# Patient Record
Sex: FEMALE | Race: ASIAN | NOT HISPANIC OR LATINO
[De-identification: names, ages, dates, MRNs, and addresses within clinical notes are randomized per-mention and may not be internally consistent; named-entity substitution may affect disease eponyms.]

---

## 2017-03-29 ENCOUNTER — CHARTING TRANS (OUTPATIENT)
Dept: OTHER | Age: 27
End: 2017-03-29

## 2017-03-29 ASSESSMENT — PAIN SCALES - GENERAL: PAINLEVEL_OUTOF10: 0

## 2017-04-12 ENCOUNTER — CHARTING TRANS (OUTPATIENT)
Dept: OTHER | Age: 27
End: 2017-04-12

## 2017-04-12 ENCOUNTER — LAB SERVICES (OUTPATIENT)
Dept: OTHER | Age: 27
End: 2017-04-12

## 2017-04-12 ASSESSMENT — PAIN SCALES - GENERAL: PAINLEVEL_OUTOF10: 0

## 2017-04-21 LAB — HPV I/H RISK 4 DNA CVX QL NAA+PROBE: NORMAL

## 2017-04-24 ENCOUNTER — CHARTING TRANS (OUTPATIENT)
Dept: OTHER | Age: 27
End: 2017-04-24

## 2017-04-28 ENCOUNTER — CHARTING TRANS (OUTPATIENT)
Dept: OTHER | Age: 27
End: 2017-04-28

## 2017-05-22 ENCOUNTER — LAB SERVICES (OUTPATIENT)
Dept: OTHER | Age: 27
End: 2017-05-22

## 2017-05-22 ENCOUNTER — CHARTING TRANS (OUTPATIENT)
Dept: OTHER | Age: 27
End: 2017-05-22

## 2017-05-22 ASSESSMENT — PAIN SCALES - GENERAL: PAINLEVEL_OUTOF10: 0

## 2017-05-25 LAB — SURGICAL PATHOLOGY: NORMAL

## 2018-01-16 ENCOUNTER — CHARTING TRANS (OUTPATIENT)
Dept: OTHER | Age: 28
End: 2018-01-16

## 2018-01-16 ASSESSMENT — PAIN SCALES - GENERAL: PAINLEVEL_OUTOF10: 0

## 2018-05-04 ENCOUNTER — CHARTING TRANS (OUTPATIENT)
Dept: OTHER | Age: 28
End: 2018-05-04

## 2018-07-11 ENCOUNTER — HOSPITAL (OUTPATIENT)
Dept: OTHER | Age: 28
End: 2018-07-11

## 2018-07-26 ENCOUNTER — CHARTING TRANS (OUTPATIENT)
Dept: OTHER | Age: 28
End: 2018-07-26

## 2018-07-30 ENCOUNTER — LAB SERVICES (OUTPATIENT)
Dept: OTHER | Age: 28
End: 2018-07-30

## 2018-07-30 ENCOUNTER — CHARTING TRANS (OUTPATIENT)
Dept: OTHER | Age: 28
End: 2018-07-30

## 2018-07-30 ASSESSMENT — PAIN SCALES - GENERAL: PAINLEVEL_OUTOF10: 4

## 2018-08-02 ENCOUNTER — CHARTING TRANS (OUTPATIENT)
Dept: OTHER | Age: 28
End: 2018-08-02

## 2018-08-02 LAB
C TRACH RRNA SPEC QL NAA+PROBE: NEGATIVE
N GONORRHOEA RRNA SPEC QL NAA+PROBE: NEGATIVE
SPECIMEN SOURCE: NORMAL

## 2018-08-04 ENCOUNTER — CHARTING TRANS (OUTPATIENT)
Dept: OTHER | Age: 28
End: 2018-08-04

## 2018-08-07 LAB — PAP WITH HIGH RISK HPV: NORMAL

## 2018-09-07 ENCOUNTER — CHARTING TRANS (OUTPATIENT)
Dept: OTHER | Age: 28
End: 2018-09-07

## 2018-10-31 VITALS
TEMPERATURE: 98.5 F | HEIGHT: 62 IN | WEIGHT: 120.26 LBS | DIASTOLIC BLOOD PRESSURE: 52 MMHG | BODY MASS INDEX: 22.13 KG/M2 | RESPIRATION RATE: 16 BRPM | SYSTOLIC BLOOD PRESSURE: 90 MMHG | OXYGEN SATURATION: 98 % | HEART RATE: 78 BPM

## 2018-10-31 VITALS
HEIGHT: 62 IN | BODY MASS INDEX: 21.79 KG/M2 | SYSTOLIC BLOOD PRESSURE: 96 MMHG | DIASTOLIC BLOOD PRESSURE: 60 MMHG | WEIGHT: 118.39 LBS | HEART RATE: 78 BPM

## 2018-11-01 VITALS
HEART RATE: 73 BPM | WEIGHT: 131 LBS | RESPIRATION RATE: 16 BRPM | DIASTOLIC BLOOD PRESSURE: 64 MMHG | TEMPERATURE: 98.7 F | HEIGHT: 62 IN | BODY MASS INDEX: 24.11 KG/M2 | OXYGEN SATURATION: 97 % | SYSTOLIC BLOOD PRESSURE: 110 MMHG

## 2018-11-02 VITALS
HEIGHT: 62 IN | TEMPERATURE: 98.8 F | RESPIRATION RATE: 16 BRPM | SYSTOLIC BLOOD PRESSURE: 128 MMHG | HEART RATE: 78 BPM | BODY MASS INDEX: 23.98 KG/M2 | WEIGHT: 130.29 LBS | OXYGEN SATURATION: 96 % | DIASTOLIC BLOOD PRESSURE: 85 MMHG

## 2018-11-03 VITALS
DIASTOLIC BLOOD PRESSURE: 73 MMHG | HEIGHT: 62 IN | SYSTOLIC BLOOD PRESSURE: 124 MMHG | BODY MASS INDEX: 24.02 KG/M2 | HEART RATE: 62 BPM | WEIGHT: 130.51 LBS

## 2018-11-04 VITALS
TEMPERATURE: 98.2 F | HEIGHT: 62 IN | SYSTOLIC BLOOD PRESSURE: 110 MMHG | WEIGHT: 132.28 LBS | HEART RATE: 92 BPM | DIASTOLIC BLOOD PRESSURE: 68 MMHG | OXYGEN SATURATION: 99 % | RESPIRATION RATE: 22 BRPM | BODY MASS INDEX: 24.34 KG/M2

## 2018-11-05 VITALS
BODY MASS INDEX: 24.5 KG/M2 | HEIGHT: 62 IN | HEART RATE: 82 BPM | RESPIRATION RATE: 18 BRPM | WEIGHT: 133.16 LBS | SYSTOLIC BLOOD PRESSURE: 100 MMHG | OXYGEN SATURATION: 98 % | DIASTOLIC BLOOD PRESSURE: 70 MMHG | TEMPERATURE: 98.4 F

## 2018-11-27 VITALS
HEART RATE: 65 BPM | DIASTOLIC BLOOD PRESSURE: 80 MMHG | WEIGHT: 119.27 LBS | OXYGEN SATURATION: 100 % | TEMPERATURE: 98.7 F | RESPIRATION RATE: 16 BRPM | HEIGHT: 62 IN | SYSTOLIC BLOOD PRESSURE: 110 MMHG | BODY MASS INDEX: 21.95 KG/M2

## 2019-01-07 ENCOUNTER — OFFICE VISIT (OUTPATIENT)
Dept: INTERNAL MEDICINE | Age: 29
End: 2019-01-07

## 2019-01-07 DIAGNOSIS — N93.9 VAGINAL BLEEDING: Primary | ICD-10-CM

## 2019-01-07 PROBLEM — R87.610 ASCUS WITH POSITIVE HIGH RISK HPV CERVICAL: Status: ACTIVE | Noted: 2017-05-22

## 2019-01-07 PROBLEM — R87.810 ASCUS WITH POSITIVE HIGH RISK HPV CERVICAL: Status: ACTIVE | Noted: 2017-05-22

## 2019-01-07 PROBLEM — F32.9 MAJOR DEPRESSIVE EPISODE: Status: ACTIVE | Noted: 2018-01-17

## 2019-01-07 PROBLEM — R32 INCONTINENCE OF URINE IN FEMALE: Status: ACTIVE | Noted: 2017-05-22

## 2019-01-07 LAB — B-HCG UR QL: NEGATIVE

## 2019-01-07 PROCEDURE — 99213 OFFICE O/P EST LOW 20 MIN: CPT | Performed by: STUDENT IN AN ORGANIZED HEALTH CARE EDUCATION/TRAINING PROGRAM

## 2019-01-07 PROCEDURE — 81025 URINE PREGNANCY TEST: CPT | Performed by: STUDENT IN AN ORGANIZED HEALTH CARE EDUCATION/TRAINING PROGRAM

## 2019-01-07 ASSESSMENT — ENCOUNTER SYMPTOMS
BACK PAIN: 0
DIZZINESS: 0
EYE PAIN: 0
SORE THROAT: 0
FEVER: 0
ABDOMINAL DISTENTION: 0
ACTIVITY CHANGE: 0
COLOR CHANGE: 0
EYE REDNESS: 0
APPETITE CHANGE: 0
ABDOMINAL PAIN: 0
DIARRHEA: 0
AGITATION: 0
SHORTNESS OF BREATH: 0
COUGH: 0
NAUSEA: 0

## 2019-01-07 ASSESSMENT — PATIENT HEALTH QUESTIONNAIRE - PHQ9
1. LITTLE INTEREST OR PLEASURE IN DOING THINGS: NOT AT ALL
2. FEELING DOWN, DEPRESSED OR HOPELESS: NOT AT ALL
SUM OF ALL RESPONSES TO PHQ9 QUESTIONS 1 AND 2: 0

## 2019-01-07 ASSESSMENT — PAIN SCALES - GENERAL: PAINLEVEL: 0

## 2019-02-12 ENCOUNTER — OFFICE VISIT (OUTPATIENT)
Dept: OBGYN | Age: 29
End: 2019-02-12

## 2019-02-12 DIAGNOSIS — Z30.430 ENCOUNTER FOR IUD INSERTION: Primary | ICD-10-CM

## 2019-02-12 DIAGNOSIS — Z11.3 SCREENING FOR STDS (SEXUALLY TRANSMITTED DISEASES): ICD-10-CM

## 2019-02-12 DIAGNOSIS — Z32.02 NEGATIVE PREGNANCY TEST: ICD-10-CM

## 2019-02-12 LAB — B-HCG UR QL: NEGATIVE

## 2019-02-12 PROCEDURE — 81025 URINE PREGNANCY TEST: CPT | Performed by: OBSTETRICS & GYNECOLOGY

## 2019-02-12 PROCEDURE — 87591 N.GONORRHOEAE DNA AMP PROB: CPT | Performed by: OBSTETRICS & GYNECOLOGY

## 2019-02-12 PROCEDURE — 87491 CHLMYD TRACH DNA AMP PROBE: CPT | Performed by: OBSTETRICS & GYNECOLOGY

## 2019-02-12 ASSESSMENT — ENCOUNTER SYMPTOMS
ENDOCRINE NEGATIVE: 1
RESPIRATORY NEGATIVE: 1
PSYCHIATRIC NEGATIVE: 1
CONSTITUTIONAL NEGATIVE: 1
GASTROINTESTINAL NEGATIVE: 1
EYES NEGATIVE: 1
ALLERGIC/IMMUNOLOGIC NEGATIVE: 1
HEMATOLOGIC/LYMPHATIC NEGATIVE: 1
NEUROLOGICAL NEGATIVE: 1

## 2019-02-12 ASSESSMENT — PAIN SCALES - GENERAL: PAINLEVEL: 0

## 2019-03-19 ENCOUNTER — OFFICE VISIT (OUTPATIENT)
Dept: OBGYN | Age: 29
End: 2019-03-19

## 2019-03-19 DIAGNOSIS — Z30.431 IUD CHECK UP: Primary | ICD-10-CM

## 2019-03-19 PROCEDURE — 99213 OFFICE O/P EST LOW 20 MIN: CPT | Performed by: OBSTETRICS & GYNECOLOGY

## 2019-03-19 ASSESSMENT — ENCOUNTER SYMPTOMS
FATIGUE: 0
RESPIRATORY NEGATIVE: 1
PSYCHIATRIC NEGATIVE: 1
GASTROINTESTINAL NEGATIVE: 1
CHILLS: 0
FEVER: 0

## 2019-03-19 ASSESSMENT — PAIN SCALES - GENERAL: PAINLEVEL: 0

## 2019-08-02 ENCOUNTER — WALK IN (OUTPATIENT)
Dept: URGENT CARE | Age: 29
End: 2019-08-02

## 2019-08-02 VITALS
RESPIRATION RATE: 16 BRPM | TEMPERATURE: 98.1 F | BODY MASS INDEX: 25.76 KG/M2 | HEART RATE: 91 BPM | DIASTOLIC BLOOD PRESSURE: 60 MMHG | WEIGHT: 140 LBS | HEIGHT: 62 IN | SYSTOLIC BLOOD PRESSURE: 100 MMHG

## 2019-08-02 DIAGNOSIS — H66.90 EAR INFECTION: Primary | ICD-10-CM

## 2019-08-02 PROCEDURE — 99214 OFFICE O/P EST MOD 30 MIN: CPT | Performed by: NURSE PRACTITIONER

## 2019-08-02 ASSESSMENT — ENCOUNTER SYMPTOMS
FEVER: 0
RESPIRATORY NEGATIVE: 1
CHILLS: 0
EYES NEGATIVE: 1
SORE THROAT: 0
RHINORRHEA: 0

## 2019-09-05 ENCOUNTER — OFFICE VISIT (OUTPATIENT)
Dept: INTERNAL MEDICINE | Age: 29
End: 2019-09-05

## 2019-09-05 DIAGNOSIS — R35.0 FREQUENCY OF URINATION: Primary | ICD-10-CM

## 2019-09-05 PROCEDURE — 81001 URINALYSIS AUTO W/SCOPE: CPT | Performed by: STUDENT IN AN ORGANIZED HEALTH CARE EDUCATION/TRAINING PROGRAM

## 2019-09-05 PROCEDURE — 80048 BASIC METABOLIC PNL TOTAL CA: CPT | Performed by: STUDENT IN AN ORGANIZED HEALTH CARE EDUCATION/TRAINING PROGRAM

## 2019-09-05 PROCEDURE — 99213 OFFICE O/P EST LOW 20 MIN: CPT | Performed by: STUDENT IN AN ORGANIZED HEALTH CARE EDUCATION/TRAINING PROGRAM

## 2019-09-05 PROCEDURE — 36415 COLL VENOUS BLD VENIPUNCTURE: CPT | Performed by: STUDENT IN AN ORGANIZED HEALTH CARE EDUCATION/TRAINING PROGRAM

## 2019-09-05 SDOH — HEALTH STABILITY: MENTAL HEALTH: HOW OFTEN DO YOU HAVE A DRINK CONTAINING ALCOHOL?: MONTHLY OR LESS

## 2019-09-05 ASSESSMENT — PATIENT HEALTH QUESTIONNAIRE - PHQ9
2. FEELING DOWN, DEPRESSED OR HOPELESS: NOT AT ALL
SUM OF ALL RESPONSES TO PHQ9 QUESTIONS 1 AND 2: 0
SUM OF ALL RESPONSES TO PHQ9 QUESTIONS 1 AND 2: 0
1. LITTLE INTEREST OR PLEASURE IN DOING THINGS: NOT AT ALL

## 2019-09-05 ASSESSMENT — PAIN SCALES - GENERAL: PAINLEVEL: 0

## 2019-09-06 ENCOUNTER — TELEPHONE (OUTPATIENT)
Dept: INTERNAL MEDICINE | Age: 29
End: 2019-09-06

## 2019-09-06 PROBLEM — R35.0 FREQUENCY OF URINATION: Status: ACTIVE | Noted: 2019-09-06

## 2019-09-06 LAB
ANION GAP SERPL CALC-SCNC: 11 MMOL/L (ref 10–20)
APPEARANCE UR: ABNORMAL
BACTERIA #/AREA URNS HPF: ABNORMAL /HPF
BILIRUB UR QL STRIP: NEGATIVE
BUN SERPL-MCNC: 9 MG/DL (ref 6–20)
BUN/CREAT SERPL: 16 (ref 7–25)
CALCIUM SERPL-MCNC: 9.3 MG/DL (ref 8.4–10.2)
CHLORIDE SERPL-SCNC: 108 MMOL/L (ref 98–107)
CO2 SERPL-SCNC: 27 MMOL/L (ref 21–32)
COLOR UR: YELLOW
CREAT SERPL-MCNC: 0.57 MG/DL (ref 0.51–0.95)
FASTING STATUS PATIENT QL REPORTED: ABNORMAL HRS
GLUCOSE SERPL-MCNC: 87 MG/DL (ref 65–99)
GLUCOSE UR STRIP-MCNC: NEGATIVE MG/DL
HGB UR QL STRIP: ABNORMAL
HYALINE CASTS #/AREA URNS LPF: ABNORMAL /LPF (ref 0–5)
KETONES UR STRIP-MCNC: NEGATIVE MG/DL
LEUKOCYTE ESTERASE UR QL STRIP: ABNORMAL
MUCOUS THREADS URNS QL MICRO: PRESENT
NITRITE UR QL STRIP: NEGATIVE
PH UR STRIP: 8 UNITS (ref 5–7)
POTASSIUM SERPL-SCNC: 4 MMOL/L (ref 3.4–5.1)
PROT UR STRIP-MCNC: NEGATIVE MG/DL
RBC #/AREA URNS HPF: ABNORMAL /HPF (ref 0–2)
SODIUM SERPL-SCNC: 142 MMOL/L (ref 135–145)
SP GR UR STRIP: 1.02 (ref 1–1.03)
SPECIMEN SOURCE: ABNORMAL
SQUAMOUS #/AREA URNS HPF: ABNORMAL /HPF (ref 0–5)
UROBILINOGEN UR STRIP-MCNC: 0.2 MG/DL (ref 0–1)
WBC #/AREA URNS HPF: ABNORMAL /HPF (ref 0–5)

## 2019-09-06 RX ORDER — CEPHALEXIN 500 MG/1
500 CAPSULE ORAL 2 TIMES DAILY
Qty: 10 CAPSULE | Refills: 0 | Status: SHIPPED | OUTPATIENT
Start: 2019-09-06 | End: 2019-09-11

## 2019-09-06 ASSESSMENT — ENCOUNTER SYMPTOMS
DIARRHEA: 0
CONSTIPATION: 0
EYE ITCHING: 0
FEVER: 0
AGITATION: 0
HEADACHES: 0
DIZZINESS: 0
WOUND: 0
NAUSEA: 0
SHORTNESS OF BREATH: 0
EYE DISCHARGE: 0
BACK PAIN: 0
FACIAL SWELLING: 0
CHILLS: 0
COUGH: 0
ABDOMINAL PAIN: 0

## 2019-09-12 ENCOUNTER — TELEPHONE (OUTPATIENT)
Dept: INTERNAL MEDICINE | Age: 29
End: 2019-09-12

## 2021-05-26 VITALS
HEART RATE: 68 BPM | BODY MASS INDEX: 23.98 KG/M2 | SYSTOLIC BLOOD PRESSURE: 116 MMHG | SYSTOLIC BLOOD PRESSURE: 105 MMHG | HEIGHT: 62 IN | DIASTOLIC BLOOD PRESSURE: 60 MMHG | HEIGHT: 62 IN | OXYGEN SATURATION: 98 % | HEART RATE: 67 BPM | RESPIRATION RATE: 20 BRPM | HEART RATE: 82 BPM | TEMPERATURE: 98.4 F | HEIGHT: 62 IN | WEIGHT: 135.58 LBS | SYSTOLIC BLOOD PRESSURE: 108 MMHG | SYSTOLIC BLOOD PRESSURE: 90 MMHG | DIASTOLIC BLOOD PRESSURE: 70 MMHG | HEIGHT: 62 IN | WEIGHT: 130.29 LBS | BODY MASS INDEX: 25.56 KG/M2 | DIASTOLIC BLOOD PRESSURE: 56 MMHG | OXYGEN SATURATION: 97 % | TEMPERATURE: 98.3 F | BODY MASS INDEX: 23.69 KG/M2 | RESPIRATION RATE: 16 BRPM | WEIGHT: 138.89 LBS | HEART RATE: 78 BPM | WEIGHT: 128.75 LBS | BODY MASS INDEX: 24.95 KG/M2 | DIASTOLIC BLOOD PRESSURE: 72 MMHG

## 2022-09-30 ENCOUNTER — OFFICE VISIT (OUTPATIENT)
Dept: OBGYN | Age: 32
End: 2022-09-30

## 2022-09-30 VITALS
TEMPERATURE: 97 F | OXYGEN SATURATION: 98 % | DIASTOLIC BLOOD PRESSURE: 78 MMHG | SYSTOLIC BLOOD PRESSURE: 120 MMHG | WEIGHT: 158.4 LBS | HEIGHT: 62 IN | HEART RATE: 71 BPM | BODY MASS INDEX: 29.15 KG/M2

## 2022-09-30 DIAGNOSIS — Z12.39 SCREENING BREAST EXAMINATION: ICD-10-CM

## 2022-09-30 DIAGNOSIS — Z01.419 GYNECOLOGIC EXAM NORMAL: Primary | ICD-10-CM

## 2022-09-30 DIAGNOSIS — Z12.4 SCREENING FOR CERVICAL CANCER: ICD-10-CM

## 2022-09-30 DIAGNOSIS — Z30.432 ENCOUNTER FOR IUD REMOVAL: ICD-10-CM

## 2022-09-30 PROBLEM — R35.0 FREQUENCY OF URINATION: Status: RESOLVED | Noted: 2019-09-06 | Resolved: 2022-09-30

## 2022-09-30 PROBLEM — R32 INCONTINENCE OF URINE IN FEMALE: Status: RESOLVED | Noted: 2017-05-22 | Resolved: 2022-09-30

## 2022-09-30 PROBLEM — R87.610 ASCUS WITH POSITIVE HIGH RISK HPV CERVICAL: Status: RESOLVED | Noted: 2017-05-22 | Resolved: 2022-09-30

## 2022-09-30 PROBLEM — F32.9 MAJOR DEPRESSIVE EPISODE: Status: RESOLVED | Noted: 2018-01-17 | Resolved: 2022-09-30

## 2022-09-30 PROBLEM — R87.810 ASCUS WITH POSITIVE HIGH RISK HPV CERVICAL: Status: RESOLVED | Noted: 2017-05-22 | Resolved: 2022-09-30

## 2022-09-30 PROCEDURE — 87801 DETECT AGNT MULT DNA AMPLI: CPT | Performed by: INTERNAL MEDICINE

## 2022-09-30 PROCEDURE — 87624 HPV HI-RISK TYP POOLED RSLT: CPT | Performed by: INTERNAL MEDICINE

## 2022-09-30 PROCEDURE — 99385 PREV VISIT NEW AGE 18-39: CPT | Performed by: NURSE PRACTITIONER

## 2022-09-30 PROCEDURE — 58301 REMOVE INTRAUTERINE DEVICE: CPT | Performed by: NURSE PRACTITIONER

## 2022-09-30 PROCEDURE — 88175 CYTOPATH C/V AUTO FLUID REDO: CPT | Performed by: INTERNAL MEDICINE

## 2022-09-30 SDOH — ECONOMIC STABILITY: TRANSPORTATION INSECURITY
IN THE PAST 12 MONTHS, HAS LACK OF TRANSPORTATION KEPT YOU FROM MEETINGS, WORK, OR FROM GETTING THINGS NEEDED FOR DAILY LIVING?: NO

## 2022-09-30 SDOH — HEALTH STABILITY: MENTAL HEALTH: HOW OFTEN DO YOU HAVE A DRINK CONTAINING ALCOHOL?: NEVER

## 2022-09-30 SDOH — ECONOMIC STABILITY: GENERAL

## 2022-09-30 SDOH — ECONOMIC STABILITY: FOOD INSECURITY: HOW OFTEN IN THE PAST 12 MONTHS WERE YOU WORRIED OR STRESSED ABOUT HAVING ENOUGH MONEY TO BUY NUTRITIOUS MEALS?: NEVER

## 2022-09-30 SDOH — ECONOMIC STABILITY: HOUSING INSECURITY: ARE YOU WORRIED ABOUT LOSING YOUR HOUSING?: NO

## 2022-09-30 SDOH — HEALTH STABILITY: MENTAL HEALTH: HOW MANY STANDARD DRINKS CONTAINING ALCOHOL DO YOU HAVE ON A TYPICAL DAY?: 0,1 OR 2

## 2022-09-30 SDOH — ECONOMIC STABILITY: TRANSPORTATION INSECURITY
IN THE PAST 12 MONTHS, HAS THE LACK OF TRANSPORTATION KEPT YOU FROM MEDICAL APPOINTMENTS OR FROM GETTING MEDICATIONS?: NO

## 2022-09-30 SDOH — ECONOMIC STABILITY: HOUSING INSECURITY: WHAT IS YOUR LIVING SITUATION TODAY?: I HAVE HOUSING

## 2022-09-30 SDOH — HEALTH STABILITY: PHYSICAL HEALTH: ON AVERAGE, HOW MANY DAYS PER WEEK DO YOU ENGAGE IN MODERATE TO STRENUOUS EXERCISE (LIKE A BRISK WALK)?: 0 DAYS

## 2022-09-30 SDOH — HEALTH STABILITY: MENTAL HEALTH: AUDIT TOTAL SCORE: 0

## 2022-09-30 SDOH — HEALTH STABILITY: PHYSICAL HEALTH: ON AVERAGE, HOW MANY MINUTES DO YOU ENGAGE IN EXERCISE AT THIS LEVEL?: 0 MIN

## 2022-09-30 SDOH — HEALTH STABILITY: MENTAL HEALTH
STRESS IS WHEN SOMEONE FEELS TENSE, NERVOUS, ANXIOUS, OR CAN'T SLEEP AT NIGHT BECAUSE THEIR MIND IS TROUBLED. HOW STRESSED ARE YOU?: NOT AT ALL

## 2022-09-30 SDOH — HEALTH STABILITY: MENTAL HEALTH: HOW OFTEN DO YOU HAVE 6 OR MORE DRINKS ON ONE OCCASION?: NEVER

## 2022-09-30 SDOH — SOCIAL STABILITY: SOCIAL NETWORK
HOW OFTEN DO YOU SEE OR TALK TO PEOPLE THAT YOU CARE ABOUT AND FEEL CLOSE TO? (FOR EXAMPLE: TALKING TO FRIENDS ON THE PHONE, VISITING FRIENDS OR FAMILY, GOING TO CHURCH OR CLUB MEETINGS): 5 OR MORE TIMES A WEEK

## 2022-09-30 ASSESSMENT — PATIENT HEALTH QUESTIONNAIRE - PHQ9
2. FEELING DOWN, DEPRESSED OR HOPELESS: NOT AT ALL
SUM OF ALL RESPONSES TO PHQ9 QUESTIONS 1 AND 2: 0
CLINICAL INTERPRETATION OF PHQ2 SCORE: NO FURTHER SCREENING NEEDED
1. LITTLE INTEREST OR PLEASURE IN DOING THINGS: NOT AT ALL
SUM OF ALL RESPONSES TO PHQ9 QUESTIONS 1 AND 2: 0

## 2022-09-30 ASSESSMENT — PAIN SCALES - GENERAL: PAINLEVEL: 0

## 2022-10-03 LAB
C TRACH RRNA SPEC QL NAA+PROBE: NEGATIVE
Lab: NORMAL
N GONORRHOEA RRNA SPEC QL NAA+PROBE: NEGATIVE

## 2022-10-08 LAB
CASE RPRT: NORMAL
CLINICAL INFO: NORMAL
CYTOLOGY CVX/VAG STUDY: NORMAL
HPV16+18+45 E6+E7MRNA CVX NAA+PROBE: NEGATIVE
Lab: NORMAL
PAP EDUCATIONAL NOTE: NORMAL
SPECIMEN ADEQUACY: NORMAL

## 2023-04-16 ENCOUNTER — WALK IN (OUTPATIENT)
Dept: URGENT CARE | Age: 33
End: 2023-04-16

## 2023-04-16 VITALS
BODY MASS INDEX: 27.6 KG/M2 | OXYGEN SATURATION: 98 % | TEMPERATURE: 98.9 F | HEART RATE: 78 BPM | SYSTOLIC BLOOD PRESSURE: 112 MMHG | DIASTOLIC BLOOD PRESSURE: 80 MMHG | HEIGHT: 62 IN | WEIGHT: 150 LBS

## 2023-04-16 DIAGNOSIS — R31.9 URINARY TRACT INFECTION WITH HEMATURIA, SITE UNSPECIFIED: Primary | ICD-10-CM

## 2023-04-16 DIAGNOSIS — Z76.89 ENCOUNTER TO ESTABLISH CARE: ICD-10-CM

## 2023-04-16 DIAGNOSIS — N39.0 URINARY TRACT INFECTION WITH HEMATURIA, SITE UNSPECIFIED: Primary | ICD-10-CM

## 2023-04-16 LAB
APPEARANCE, POC: CLEAR
BILIRUBIN, POC: NEGATIVE
COLOR, POC: ABNORMAL
GLUCOSE UR-MCNC: NEGATIVE MG/DL
KETONES, POC: NEGATIVE MG/DL
NITRITE, POC: NEGATIVE
OCCULT BLOOD, POC: ABNORMAL
PH UR: 5 [PH] (ref 5–7)
PROT UR-MCNC: NEGATIVE MG/DL
SP GR UR: 1.02 (ref 1–1.03)
UROBILINOGEN UR-MCNC: NORMAL MG/DL (ref 0–1)
WBC (LEUKOCYTE) ESTERASE, POC: ABNORMAL

## 2023-04-16 PROCEDURE — 99213 OFFICE O/P EST LOW 20 MIN: CPT | Performed by: NURSE PRACTITIONER

## 2023-04-16 PROCEDURE — 81002 URINALYSIS NONAUTO W/O SCOPE: CPT | Performed by: NURSE PRACTITIONER

## 2023-04-16 RX ORDER — CEPHALEXIN 500 MG/1
500 CAPSULE ORAL 2 TIMES DAILY
Qty: 10 CAPSULE | Refills: 0 | Status: SHIPPED | OUTPATIENT
Start: 2023-04-16 | End: 2023-04-21

## 2023-04-16 ASSESSMENT — ENCOUNTER SYMPTOMS
VOMITING: 0
FEVER: 0
SWEATS: 0
NAUSEA: 0
CHILLS: 0

## 2023-04-16 ASSESSMENT — PAIN SCALES - GENERAL: PAINLEVEL: 6

## 2023-04-21 ENCOUNTER — OFFICE VISIT (OUTPATIENT)
Dept: OBGYN | Age: 33
End: 2023-04-21

## 2023-04-21 VITALS
SYSTOLIC BLOOD PRESSURE: 121 MMHG | HEIGHT: 62 IN | HEART RATE: 60 BPM | DIASTOLIC BLOOD PRESSURE: 82 MMHG | WEIGHT: 157 LBS | BODY MASS INDEX: 28.89 KG/M2

## 2023-04-21 DIAGNOSIS — Z30.017 ENCOUNTER FOR INITIAL PRESCRIPTION OF IMPLANTABLE SUBDERMAL CONTRACEPTIVE: Primary | ICD-10-CM

## 2023-04-21 PROCEDURE — 99212 OFFICE O/P EST SF 10 MIN: CPT | Performed by: NURSE PRACTITIONER

## 2023-04-23 ENCOUNTER — E-ADVICE (OUTPATIENT)
Dept: OBGYN | Age: 33
End: 2023-04-23

## 2023-05-01 RX ORDER — NORGESTIMATE AND ETHINYL ESTRADIOL 0.25-0.035
1 KIT ORAL DAILY
Qty: 90 TABLET | Refills: 3 | Status: SHIPPED | OUTPATIENT
Start: 2023-05-01

## 2023-05-16 ENCOUNTER — APPOINTMENT (OUTPATIENT)
Dept: OBGYN | Age: 33
End: 2023-05-16

## 2023-05-30 ENCOUNTER — APPOINTMENT (OUTPATIENT)
Dept: FAMILY MEDICINE | Age: 33
End: 2023-05-30

## 2023-06-15 ENCOUNTER — TELEPHONE (OUTPATIENT)
Dept: OBGYN | Age: 33
End: 2023-06-15

## 2023-07-03 ENCOUNTER — APPOINTMENT (OUTPATIENT)
Dept: URGENT CARE | Age: 33
End: 2023-07-03

## 2023-07-05 ENCOUNTER — TELEPHONE (OUTPATIENT)
Dept: URGENT CARE | Age: 33
End: 2023-07-05

## 2023-07-07 ENCOUNTER — WALK IN (OUTPATIENT)
Dept: URGENT CARE | Age: 33
End: 2023-07-07

## 2023-07-07 VITALS
HEIGHT: 62 IN | TEMPERATURE: 96.8 F | OXYGEN SATURATION: 97 % | DIASTOLIC BLOOD PRESSURE: 90 MMHG | BODY MASS INDEX: 27.7 KG/M2 | WEIGHT: 150.5 LBS | HEART RATE: 77 BPM | SYSTOLIC BLOOD PRESSURE: 138 MMHG

## 2023-07-07 DIAGNOSIS — J40 BRONCHITIS: Primary | ICD-10-CM

## 2023-07-07 LAB
INTERNAL PROCEDURAL CONTROLS ACCEPTABLE: YES
S PYO AG THROAT QL IA.RAPID: NEGATIVE
TEST LOT EXPIRATION DATE: NORMAL
TEST LOT NUMBER: NORMAL

## 2023-07-07 PROCEDURE — 99213 OFFICE O/P EST LOW 20 MIN: CPT | Performed by: NURSE PRACTITIONER

## 2023-07-07 PROCEDURE — 87880 STREP A ASSAY W/OPTIC: CPT | Performed by: NURSE PRACTITIONER

## 2023-07-07 RX ORDER — BENZONATATE 200 MG/1
200 CAPSULE ORAL 3 TIMES DAILY PRN
Qty: 20 CAPSULE | Refills: 0 | Status: SHIPPED | OUTPATIENT
Start: 2023-07-07 | End: 2023-07-14

## 2023-07-07 RX ORDER — AZITHROMYCIN 250 MG/1
TABLET, FILM COATED ORAL
Qty: 6 TABLET | Refills: 0 | Status: SHIPPED | OUTPATIENT
Start: 2023-07-07 | End: 2023-07-12

## 2023-07-07 ASSESSMENT — ENCOUNTER SYMPTOMS
PSYCHIATRIC NEGATIVE: 1
LIGHT-HEADEDNESS: 0
WHEEZING: 1
COUGH: 1
SHORTNESS OF BREATH: 0
CHILLS: 0
EYES NEGATIVE: 1
SORE THROAT: 1
DIZZINESS: 0
FATIGUE: 0
HEADACHES: 0
HEMATOLOGIC/LYMPHATIC NEGATIVE: 1
DIAPHORESIS: 0
FEVER: 0
ALLERGIC/IMMUNOLOGIC NEGATIVE: 1
RHINORRHEA: 1
GASTROINTESTINAL NEGATIVE: 1

## 2023-07-07 ASSESSMENT — VISUAL ACUITY: OU: 1

## 2023-07-14 RX ORDER — NORGESTIMATE AND ETHINYL ESTRADIOL 0.25-0.035
1 KIT ORAL DAILY
Qty: 90 TABLET | Refills: 3 | OUTPATIENT
Start: 2023-07-14

## 2023-07-17 RX ORDER — NORGESTIMATE AND ETHINYL ESTRADIOL 0.25-0.035
1 KIT ORAL DAILY
Qty: 90 TABLET | Refills: 3 | OUTPATIENT
Start: 2023-07-17

## 2023-07-18 ENCOUNTER — APPOINTMENT (OUTPATIENT)
Dept: OBGYN | Age: 33
End: 2023-07-18

## 2023-12-09 ENCOUNTER — V-VISIT (OUTPATIENT)
Dept: URGENT CARE | Age: 33
End: 2023-12-09

## 2023-12-09 VITALS
HEIGHT: 62 IN | OXYGEN SATURATION: 96 % | TEMPERATURE: 97.6 F | SYSTOLIC BLOOD PRESSURE: 130 MMHG | HEART RATE: 60 BPM | BODY MASS INDEX: 26.31 KG/M2 | WEIGHT: 143 LBS | DIASTOLIC BLOOD PRESSURE: 84 MMHG | RESPIRATION RATE: 14 BRPM

## 2023-12-09 DIAGNOSIS — J06.9 UPPER RESPIRATORY TRACT INFECTION, UNSPECIFIED TYPE: Primary | ICD-10-CM

## 2023-12-09 PROCEDURE — 99213 OFFICE O/P EST LOW 20 MIN: CPT | Performed by: NURSE PRACTITIONER

## 2023-12-09 RX ORDER — METHYLPREDNISOLONE 4 MG/1
TABLET ORAL
Qty: 21 TABLET | Refills: 0 | Status: SHIPPED | OUTPATIENT
Start: 2023-12-09

## 2023-12-09 RX ORDER — ALBUTEROL SULFATE 90 UG/1
2 AEROSOL, METERED RESPIRATORY (INHALATION) EVERY 4 HOURS
Qty: 1 EACH | Refills: 0 | Status: SHIPPED | OUTPATIENT
Start: 2023-12-09 | End: 2023-12-14

## 2023-12-09 ASSESSMENT — ENCOUNTER SYMPTOMS
EYE DISCHARGE: 0
DIARRHEA: 0
FEVER: 0
RHINORRHEA: 0
COUGH: 1
VOMITING: 0
EYE REDNESS: 0
SHORTNESS OF BREATH: 0
CHILLS: 0
HEADACHES: 0
NAUSEA: 0
SORE THROAT: 0
WHEEZING: 0

## 2024-01-26 RX ORDER — NORGESTIMATE AND ETHINYL ESTRADIOL 0.25-0.035
1 KIT ORAL DAILY
Qty: 90 TABLET | Refills: 3 | OUTPATIENT
Start: 2024-01-26

## 2024-02-05 RX ORDER — NORGESTIMATE AND ETHINYL ESTRADIOL 0.25-0.035
1 KIT ORAL DAILY
Qty: 90 TABLET | Refills: 0 | Status: SHIPPED | OUTPATIENT
Start: 2024-02-05

## 2024-02-12 RX ORDER — NORGESTIMATE AND ETHINYL ESTRADIOL 0.25-0.035
1 KIT ORAL DAILY
Qty: 84 TABLET | OUTPATIENT
Start: 2024-02-12

## 2024-02-17 ENCOUNTER — E-ADVICE (OUTPATIENT)
Dept: OTHER | Age: 34
End: 2024-02-17

## 2024-02-19 RX ORDER — NORGESTIMATE AND ETHINYL ESTRADIOL 0.25-0.035
1 KIT ORAL DAILY
Qty: 90 TABLET | Refills: 0 | OUTPATIENT
Start: 2024-02-19

## 2024-02-27 RX ORDER — NORGESTIMATE AND ETHINYL ESTRADIOL 0.25-0.035
1 KIT ORAL DAILY
Qty: 90 TABLET | Refills: 0 | OUTPATIENT
Start: 2024-02-27

## 2024-03-07 ENCOUNTER — APPOINTMENT (OUTPATIENT)
Dept: URGENT CARE | Age: 34
End: 2024-03-07

## 2024-03-18 ENCOUNTER — APPOINTMENT (OUTPATIENT)
Dept: OBGYN | Age: 34
End: 2024-03-18

## 2024-03-18 VITALS
OXYGEN SATURATION: 99 % | HEART RATE: 61 BPM | BODY MASS INDEX: 25.25 KG/M2 | DIASTOLIC BLOOD PRESSURE: 77 MMHG | HEIGHT: 62 IN | SYSTOLIC BLOOD PRESSURE: 125 MMHG | WEIGHT: 137.24 LBS

## 2024-03-18 DIAGNOSIS — Z30.41 ENCOUNTER FOR SURVEILLANCE OF CONTRACEPTIVE PILLS: ICD-10-CM

## 2024-03-18 DIAGNOSIS — Z13.220 SCREENING CHOLESTEROL LEVEL: ICD-10-CM

## 2024-03-18 DIAGNOSIS — Z01.419 GYNECOLOGIC EXAM NORMAL: Primary | ICD-10-CM

## 2024-03-18 PROCEDURE — 99395 PREV VISIT EST AGE 18-39: CPT | Performed by: OBSTETRICS & GYNECOLOGY

## 2024-03-18 RX ORDER — NORGESTIMATE AND ETHINYL ESTRADIOL 0.25-0.035
1 KIT ORAL DAILY
Qty: 84 TABLET | Refills: 4 | Status: SHIPPED | OUTPATIENT
Start: 2024-03-18

## 2024-03-18 SDOH — SOCIAL STABILITY: SOCIAL INSECURITY: HOW OFTEN DOES ANYONE, INCLUDING FAMILY AND FRIENDS, INSULT OR TALK DOWN TO YOU?: NEVER

## 2024-03-18 SDOH — ECONOMIC STABILITY: HOUSING INSECURITY: WHAT IS YOUR LIVING SITUATION TODAY?: I HAVE A STEADY PLACE TO LIVE

## 2024-03-18 SDOH — HEALTH STABILITY: PHYSICAL HEALTH: ON AVERAGE, HOW MANY MINUTES DO YOU ENGAGE IN EXERCISE AT THIS LEVEL?: 0 MIN

## 2024-03-18 SDOH — HEALTH STABILITY: MENTAL HEALTH: HOW OFTEN DO YOU HAVE 6 OR MORE DRINKS ON ONE OCCASION?: LESS THAN MONTHLY

## 2024-03-18 SDOH — HEALTH STABILITY: MENTAL HEALTH: HOW OFTEN DO YOU HAVE A DRINK CONTAINING ALCOHOL?: MONTHLY OR LESS

## 2024-03-18 SDOH — SOCIAL STABILITY: SOCIAL INSECURITY: HOW OFTEN DOES ANYONE, INCLUDING FAMILY AND FRIENDS, THREATEN YOU WITH HARM?: NEVER

## 2024-03-18 SDOH — ECONOMIC STABILITY: HOUSING INSECURITY: DO YOU HAVE PROBLEMS WITH ANY OF THE FOLLOWING?: NONE OF THE ABOVE

## 2024-03-18 SDOH — ECONOMIC STABILITY: GENERAL

## 2024-03-18 SDOH — HEALTH STABILITY: MENTAL HEALTH: AUDIT TOTAL SCORE: 3

## 2024-03-18 SDOH — ECONOMIC STABILITY: TRANSPORTATION INSECURITY
IN THE PAST 12 MONTHS, HAS LACK OF RELIABLE TRANSPORTATION KEPT YOU FROM MEDICAL APPOINTMENTS, MEETINGS, WORK OR FROM GETTING THINGS NEEDED FOR DAILY LIVING?: NO

## 2024-03-18 SDOH — HEALTH STABILITY: MENTAL HEALTH: HOW MANY STANDARD DRINKS CONTAINING ALCOHOL DO YOU HAVE ON A TYPICAL DAY?: 3 OR 4

## 2024-03-18 SDOH — SOCIAL STABILITY: SOCIAL INSECURITY: HOW OFTEN DOES ANYONE, INCLUDING FAMILY AND FRIENDS, SCREAM OR CURSE AT YOU?: NEVER

## 2024-03-18 SDOH — SOCIAL STABILITY: SOCIAL INSECURITY: HOW OFTEN DOES ANYONE, INCLUDING FAMILY AND FRIENDS, PHYSICALLY HURT YOU?: NEVER

## 2024-03-18 SDOH — HEALTH STABILITY: MENTAL HEALTH
STRESS IS WHEN SOMEONE FEELS TENSE, NERVOUS, ANXIOUS, OR CAN'T SLEEP AT NIGHT BECAUSE THEIR MIND IS TROUBLED. HOW STRESSED ARE YOU?: A LITTLE BIT

## 2024-03-18 SDOH — HEALTH STABILITY: PHYSICAL HEALTH: ON AVERAGE, HOW MANY DAYS PER WEEK DO YOU ENGAGE IN MODERATE TO STRENUOUS EXERCISE (LIKE A BRISK WALK)?: 0 DAYS

## 2024-03-18 ASSESSMENT — PAIN SCALES - GENERAL: PAINLEVEL: 0

## 2024-03-18 ASSESSMENT — SOCIAL DETERMINANTS OF HEALTH (SDOH): IN THE PAST 12 MONTHS, HAS THE ELECTRIC, GAS, OIL, OR WATER COMPANY THREATENED TO SHUT OFF SERVICE IN YOUR HOME?: NO

## 2024-03-18 ASSESSMENT — PATIENT HEALTH QUESTIONNAIRE - PHQ9
SUM OF ALL RESPONSES TO PHQ9 QUESTIONS 1 AND 2: 0
1. LITTLE INTEREST OR PLEASURE IN DOING THINGS: NOT AT ALL
SUM OF ALL RESPONSES TO PHQ9 QUESTIONS 1 AND 2: 0
2. FEELING DOWN, DEPRESSED OR HOPELESS: NOT AT ALL
CLINICAL INTERPRETATION OF PHQ2 SCORE: NO FURTHER SCREENING NEEDED

## 2024-04-30 ENCOUNTER — APPOINTMENT (OUTPATIENT)
Dept: OBGYN | Age: 34
End: 2024-04-30

## 2024-05-26 ENCOUNTER — APPOINTMENT (OUTPATIENT)
Dept: URGENT CARE | Age: 34
End: 2024-05-26

## 2024-08-24 ENCOUNTER — TELEPHONE (OUTPATIENT)
Dept: URGENT CARE | Age: 34
End: 2024-08-24

## 2024-08-24 ENCOUNTER — APPOINTMENT (OUTPATIENT)
Dept: URGENT CARE | Age: 34
End: 2024-08-24

## 2024-08-24 ENCOUNTER — HOSPITAL ENCOUNTER (OUTPATIENT)
Age: 34
Discharge: HOME | End: 2024-08-24
Payer: COMMERCIAL

## 2024-08-24 ENCOUNTER — APPOINTMENT (OUTPATIENT)
Dept: GENERAL RADIOLOGY | Age: 34
End: 2024-08-24
Attending: NURSE PRACTITIONER
Payer: COMMERCIAL

## 2024-08-24 VITALS
SYSTOLIC BLOOD PRESSURE: 119 MMHG | RESPIRATION RATE: 18 BRPM | DIASTOLIC BLOOD PRESSURE: 60 MMHG | OXYGEN SATURATION: 100 % | HEART RATE: 73 BPM | TEMPERATURE: 98 F

## 2024-08-24 DIAGNOSIS — M62.838 SPASM OF MUSCLE: ICD-10-CM

## 2024-08-24 DIAGNOSIS — S16.1XXA STRAIN OF NECK MUSCLE, INITIAL ENCOUNTER: ICD-10-CM
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
## 2024-08-24 PROCEDURE — 99203 OFFICE O/P NEW LOW 30 MIN: CPT

## 2024-08-24 PROCEDURE — 72040 X-RAY EXAM NECK SPINE 2-3 VW: CPT | Performed by: NURSE PRACTITIONER

## 2024-08-24 PROCEDURE — 99204 OFFICE O/P NEW MOD 45 MIN: CPT

## 2024-08-24 RX ORDER — CYCLOBENZAPRINE HCL 10 MG
10 TABLET ORAL 3 TIMES DAILY PRN
Qty: 20 TABLET | Refills: 0 | Status: SHIPPED | OUTPATIENT
Start: 2024-08-24 | End: 2024-08-31

## 2024-08-24 RX ORDER — IBUPROFEN 600 MG/1
600 TABLET, FILM COATED ORAL EVERY 8 HOURS PRN
Qty: 30 TABLET | Refills: 0 | Status: SHIPPED | OUTPATIENT
Start: 2024-08-24 | End: 2024-08-31
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
## 2025-03-31 ENCOUNTER — APPOINTMENT (OUTPATIENT)
Dept: OBGYN | Age: 35
End: 2025-03-31

## (undated) NOTE — LETTER
Date & Time: 8/24/2024, 12:05 PM  Patient: Eric Lagunas  Encounter Provider(s):    Nidia Flores APRN       To Whom It May Concern:    Eric Lagunas was seen and treated in our department on 8/24/2024. She can return to work Aug 28, 2024.    If you have any questions or concerns, please do not hesitate to call.      Nidia Flores NP  _____________________________  Physician/[facility] Signature